# Patient Record
Sex: FEMALE | Race: WHITE | NOT HISPANIC OR LATINO | Employment: FULL TIME | URBAN - METROPOLITAN AREA
[De-identification: names, ages, dates, MRNs, and addresses within clinical notes are randomized per-mention and may not be internally consistent; named-entity substitution may affect disease eponyms.]

---

## 2018-02-04 ENCOUNTER — OFFICE VISIT (OUTPATIENT)
Dept: URGENT CARE | Facility: CLINIC | Age: 28
End: 2018-02-04
Payer: COMMERCIAL

## 2018-02-04 ENCOUNTER — HOSPITAL ENCOUNTER (OUTPATIENT)
Facility: MEDICAL CENTER | Age: 28
End: 2018-02-04
Attending: PHYSICIAN ASSISTANT
Payer: COMMERCIAL

## 2018-02-04 VITALS
WEIGHT: 112.43 LBS | DIASTOLIC BLOOD PRESSURE: 58 MMHG | HEIGHT: 66 IN | SYSTOLIC BLOOD PRESSURE: 98 MMHG | HEART RATE: 84 BPM | OXYGEN SATURATION: 98 % | BODY MASS INDEX: 18.07 KG/M2 | TEMPERATURE: 100 F | RESPIRATION RATE: 14 BRPM

## 2018-02-04 DIAGNOSIS — N30.01 ACUTE CYSTITIS WITH HEMATURIA: ICD-10-CM

## 2018-02-04 DIAGNOSIS — R10.30 LOWER ABDOMINAL PAIN: ICD-10-CM

## 2018-02-04 LAB
APPEARANCE UR: NORMAL
BILIRUB UR STRIP-MCNC: NORMAL MG/DL
COLOR UR AUTO: NORMAL
GLUCOSE UR STRIP.AUTO-MCNC: NORMAL MG/DL
INT CON NEG: NEGATIVE
INT CON POS: POSITIVE
KETONES UR STRIP.AUTO-MCNC: NORMAL MG/DL
LEUKOCYTE ESTERASE UR QL STRIP.AUTO: NORMAL
NITRITE UR QL STRIP.AUTO: NORMAL
PH UR STRIP.AUTO: 6.5 [PH] (ref 5–8)
POC URINE PREGNANCY TEST: NORMAL
PROT UR QL STRIP: NORMAL MG/DL
RBC UR QL AUTO: NORMAL
SP GR UR STRIP.AUTO: 1.02
UROBILINOGEN UR STRIP-MCNC: 0.2 MG/DL

## 2018-02-04 PROCEDURE — 81002 URINALYSIS NONAUTO W/O SCOPE: CPT | Performed by: PHYSICIAN ASSISTANT

## 2018-02-04 PROCEDURE — 81025 URINE PREGNANCY TEST: CPT | Performed by: PHYSICIAN ASSISTANT

## 2018-02-04 PROCEDURE — 99204 OFFICE O/P NEW MOD 45 MIN: CPT | Performed by: PHYSICIAN ASSISTANT

## 2018-02-04 PROCEDURE — 87086 URINE CULTURE/COLONY COUNT: CPT

## 2018-02-04 RX ORDER — CIPROFLOXACIN 500 MG/1
500 TABLET, FILM COATED ORAL EVERY 12 HOURS
Qty: 14 TAB | Refills: 0 | Status: SHIPPED | OUTPATIENT
Start: 2018-02-04 | End: 2018-02-11

## 2018-02-04 RX ORDER — NAPROXEN 500 MG/1
500 TABLET ORAL 2 TIMES DAILY WITH MEALS
Qty: 60 TAB | Refills: 0 | Status: SHIPPED | OUTPATIENT
Start: 2018-02-04 | End: 2018-03-06

## 2018-02-04 ASSESSMENT — ENCOUNTER SYMPTOMS
FEVER: 0
BACK PAIN: 0
CHILLS: 0
NAUSEA: 1
FLANK PAIN: 1
COUGH: 0
PALPITATIONS: 0
ABDOMINAL PAIN: 1
SHORTNESS OF BREATH: 0

## 2018-02-04 NOTE — PROGRESS NOTES
"Subjective:      Chase Noland is a 27 y.o. female who presents with Abdominal Pain (lower abdominal pain x 1 day . needs school note .)            Abdominal Pain   This is a new problem. The current episode started today. The onset quality is sudden. The problem occurs constantly. The problem has been gradually improving. The pain is located in the suprapubic region. The pain is moderate. The quality of the pain is sharp. The abdominal pain radiates to the back. Associated symptoms include nausea. Pertinent negatives include no dysuria, fever, frequency or hematuria. The pain is relieved by nothing. There is no history of abdominal surgery.       Review of Systems   Constitutional: Negative for chills and fever.   Respiratory: Negative for cough and shortness of breath.    Cardiovascular: Negative for chest pain and palpitations.   Gastrointestinal: Positive for abdominal pain and nausea.   Genitourinary: Positive for flank pain. Negative for dysuria, frequency, hematuria and urgency.   Musculoskeletal: Negative for back pain.   All other systems reviewed and are negative.    PMH:  has no past medical history on file.  MEDS:   Current Outpatient Prescriptions:   •  naproxen (NAPROSYN) 500 MG Tab, Take 1 Tab by mouth 2 times a day, with meals for 30 days., Disp: 60 Tab, Rfl: 0  •  ciprofloxacin (CIPRO) 500 MG Tab, Take 1 Tab by mouth every 12 hours for 7 days., Disp: 14 Tab, Rfl: 0  ALLERGIES: No Known Allergies  SURGHX: History reviewed. No pertinent surgical history.  SOCHX:  reports that she has never smoked. She has never used smokeless tobacco.  FH: Family history was reviewed, no pertinent findings to report  Medications, Allergies, and current problem list reviewed today in Epic       Objective:     BP (!) 98/58   Pulse 84   Temp 37.8 °C (100 °F)   Resp 14   Ht 1.68 m (5' 6.14\")   Wt 51 kg (112 lb 7 oz)   LMP 01/17/2018   SpO2 98%   Breastfeeding? No   BMI 18.07 kg/m²      Physical Exam "   Constitutional: She is oriented to person, place, and time. She appears well-developed and well-nourished. She is active.  Non-toxic appearance. She does not have a sickly appearance. She does not appear ill. No distress.   HENT:   Head: Normocephalic and atraumatic.   Right Ear: External ear normal.   Left Ear: External ear normal.   Nose: Nose normal.   Mouth/Throat: Oropharynx is clear and moist.   Eyes: Conjunctivae, EOM and lids are normal.   Neck: Normal range of motion and full passive range of motion without pain. Neck supple.   Cardiovascular: Normal rate, regular rhythm, S1 normal, S2 normal and normal heart sounds.  Exam reveals no gallop and no friction rub.    No murmur heard.  Pulmonary/Chest: Effort normal and breath sounds normal. No respiratory distress. She has no decreased breath sounds. She has no wheezes. She has no rales. She exhibits no tenderness.   Abdominal: Soft. Normal appearance and bowel sounds are normal. She exhibits no shifting dullness, no distension, no pulsatile liver, no fluid wave, no abdominal bruit, no ascites, no pulsatile midline mass and no mass. There is tenderness in the suprapubic area. There is no rigidity, no rebound, no guarding, no CVA tenderness, no tenderness at McBurney's point and negative Capone's sign.       Musculoskeletal: Normal range of motion. She exhibits no edema, tenderness or deformity.   CVA tenderness present.   Neurological: She is alert and oriented to person, place, and time.   Skin: Skin is warm, dry and intact.   Psychiatric: She has a normal mood and affect. Her speech is normal and behavior is normal. Judgment and thought content normal.   Vitals reviewed.         POC Color kaushal  Negative Final   POC Appearance cloudy  Negative Final   POC Leukocyte Esterase lg  Negative Final   POC Nitrites neg  Negative Final   POC Urobiligen 0.2  Negative (0.2) mg/dL Final   POC Protein lg  Negative mg/dL Final   POC Urine PH 6.5  5.0 - 8.0 Final   POC  Blood lg  Negative Final   POC Specific Gravity 1.025  <1.005 - >1.030 Final   POC Ketones mod  Negative mg/dL Final   POC Biliruben neg  Negative mg/dL Final   POC Glucose neg  Negative mg/dL Final          Assessment/Plan:   Patient is a 27-year-old female who presents with lower abdominal pain for one hour. Patient states that she is having intercourse with her partner and afterwards experienced sharp pains in her lower abdomen which radiated to her back. The pain has subsided significantly since then. Vitals show low-grade fever. Abdominal pain in the super pubic region. CVS tenderness is present. No rebound tenderness noted. UA shows large amount of leukocytes and blood. Possible UTI. Also given her history possible ovarian cysts. We will trial antibiotics while culture is pending. Strict emergency precautions were given if the pain continues or worsens to go to the ED.  1. Lower abdominal pain    - POCT Pregnancy  - POCT Urinalysis  - naproxen (NAPROSYN) 500 MG Tab; Take 1 Tab by mouth 2 times a day, with meals for 30 days.  Dispense: 60 Tab; Refill: 0    2. Acute cystitis with hematuria    - Urine Culture; Future  - ciprofloxacin (CIPRO) 500 MG Tab; Take 1 Tab by mouth every 12 hours for 7 days.  Dispense: 14 Tab; Refill: 0    Differential diagnosis, natural history, supportive care discussed. Follow-up with primary care provider within 7-10 days, emergency room precautions discussed.  Patient and/or family appears understanding of information.

## 2018-02-04 NOTE — PATIENT INSTRUCTIONS
"Ovarian Cyst  An ovarian cyst is a fluid-filled sac that forms on an ovary. The ovaries are small organs that produce eggs in women. Various types of cysts can form on the ovaries. Most are not cancerous. Many do not cause problems, and they often go away on their own. Some may cause symptoms and require treatment. Common types of ovarian cysts include:  · Functional cysts--These cysts may occur every month during the menstrual cycle. This is normal. The cysts usually go away with the next menstrual cycle if the woman does not get pregnant. Usually, there are no symptoms with a functional cyst.  · Endometrioma cysts--These cysts form from the tissue that lines the uterus. They are also called \"chocolate cysts\" because they become filled with blood that turns brown. This type of cyst can cause pain in the lower abdomen during intercourse and with your menstrual period.  · Cystadenoma cysts--This type develops from the cells on the outside of the ovary. These cysts can get very big and cause lower abdomen pain and pain with intercourse. This type of cyst can twist on itself, cut off its blood supply, and cause severe pain. It can also easily rupture and cause a lot of pain.  · Dermoid cysts--This type of cyst is sometimes found in both ovaries. These cysts may contain different kinds of body tissue, such as skin, teeth, hair, or cartilage. They usually do not cause symptoms unless they get very big.  · Theca lutein cysts--These cysts occur when too much of a certain hormone (human chorionic gonadotropin) is produced and overstimulates the ovaries to produce an egg. This is most common after procedures used to assist with the conception of a baby (in vitro fertilization).  CAUSES   · Fertility drugs can cause a condition in which multiple large cysts are formed on the ovaries. This is called ovarian hyperstimulation syndrome.  · A condition called polycystic ovary syndrome can cause hormonal imbalances that can lead to " nonfunctional ovarian cysts.  SIGNS AND SYMPTOMS   Many ovarian cysts do not cause symptoms. If symptoms are present, they may include:  · Pelvic pain or pressure.  · Pain in the lower abdomen.  · Pain during/after sexual intercourse.  · Increasing girth (swelling) of the abdomen.  · Abnormal menstrual periods.  · Increasing pain with menstrual periods.  · Stopping having menstrual periods without being pregnant.  DIAGNOSIS   These cysts are commonly found during a routine or annual pelvic exam. Tests may be ordered to find out more about the cyst. These tests may include:  · Ultrasound.  · X-ray of the pelvis.  · CT scan.  · MRI.  · Blood tests.  TREATMENT   Many ovarian cysts go away on their own without treatment. Your health care provider may want to check your cyst regularly for 2-3 months to see if it changes. For women in menopause, it is particularly important to monitor a cyst closely because of the higher rate of ovarian cancer in menopausal women. When treatment is needed, it may include any of the following:  · A procedure to drain the cyst (aspiration). This may be done using a long needle and ultrasound. It can also be done through a laparoscopic procedure. This involves using a thin, lighted tube with a tiny camera on the end (laparoscope) inserted through a small incision.  · Surgery to remove the whole cyst. This may be done using laparoscopic surgery or an open surgery involving a larger incision in the lower abdomen.  · Hormone treatment or birth control pills. These methods are sometimes used to help dissolve a cyst.  HOME CARE INSTRUCTIONS   · Only take over-the-counter or prescription medicines as directed by your health care provider.  · Follow up with your health care provider as directed.  · Get regular pelvic exams and Pap tests.  SEEK MEDICAL CARE IF:   · Your periods are late, irregular, or painful, or they stop.  · Your pelvic pain or abdominal pain does not go away.  · Your abdomen  becomes larger or swollen.  · You have pressure on your bladder or trouble emptying your bladder completely.  · You have pain during sexual intercourse.  · You have feelings of fullness, pressure, or discomfort in your stomach.  · You lose weight for no apparent reason.  · You feel generally ill.  · You become constipated.  · You lose your appetite.  · You develop acne.  · You have an increase in body and facial hair.  · You are gaining weight, without changing your exercise and eating habits.  · You think you are pregnant.  SEEK IMMEDIATE MEDICAL CARE IF:   · You have increasing abdominal pain.  · You feel sick to your stomach (nauseous), and you throw up (vomit).  · You develop a fever that comes on suddenly.  · You have abdominal pain during a bowel movement.  · Your menstrual periods become heavier than usual.  MAKE SURE YOU:  · Understand these instructions.  · Will watch your condition.  · Will get help right away if you are not doing well or get worse.     This information is not intended to replace advice given to you by your health care provider. Make sure you discuss any questions you have with your health care provider.     Document Released: 12/18/2006 Document Revised: 12/23/2014 Document Reviewed: 08/25/2014  [employer] Interactive Patient Education ©2016 [employer]

## 2018-02-04 NOTE — LETTER
February 4, 2018         Patient: Chase Noland   YOB: 1990   Date of Visit: 2/4/2018           To Whom it May Concern:    Chase Noland was seen in my clinic on 2/4/2018. Please excuse her from school on 2/5/2018.    If you have any questions or concerns, please don't hesitate to call.        Sincerely,           Fran Wen P.A.-C.  Electronically Signed

## 2018-02-05 DIAGNOSIS — N30.01 ACUTE CYSTITIS WITH HEMATURIA: ICD-10-CM

## 2018-02-07 LAB
BACTERIA UR CULT: NORMAL
SIGNIFICANT IND 70042: NORMAL
SITE SITE: NORMAL
SOURCE SOURCE: NORMAL

## 2019-09-16 ENCOUNTER — TELEPHONE (OUTPATIENT)
Dept: SCHEDULING | Facility: IMAGING CENTER | Age: 29
End: 2019-09-16

## 2019-09-17 ENCOUNTER — OFFICE VISIT (OUTPATIENT)
Dept: MEDICAL GROUP | Facility: PHYSICIAN GROUP | Age: 29
End: 2019-09-17
Payer: COMMERCIAL

## 2019-09-17 VITALS
DIASTOLIC BLOOD PRESSURE: 62 MMHG | WEIGHT: 106 LBS | HEIGHT: 66 IN | TEMPERATURE: 100.1 F | OXYGEN SATURATION: 99 % | HEART RATE: 86 BPM | BODY MASS INDEX: 17.04 KG/M2 | SYSTOLIC BLOOD PRESSURE: 98 MMHG

## 2019-09-17 DIAGNOSIS — N30.10 INTERSTITIAL CYSTITIS: ICD-10-CM

## 2019-09-17 DIAGNOSIS — N63.20 LEFT BREAST MASS: ICD-10-CM

## 2019-09-17 PROCEDURE — 99214 OFFICE O/P EST MOD 30 MIN: CPT | Performed by: PHYSICIAN ASSISTANT

## 2019-09-17 RX ORDER — PENTOSAN POLYSULFATE SODIUM 100 MG/1
CAPSULE, GELATIN COATED ORAL
COMMUNITY
Start: 2019-07-23 | End: 2021-09-29

## 2019-09-17 SDOH — HEALTH STABILITY: MENTAL HEALTH: HOW OFTEN DO YOU HAVE A DRINK CONTAINING ALCOHOL?: NEVER

## 2019-09-17 ASSESSMENT — PATIENT HEALTH QUESTIONNAIRE - PHQ9: CLINICAL INTERPRETATION OF PHQ2 SCORE: 0

## 2019-09-17 NOTE — PROGRESS NOTES
"Subjective:   Chase Noland is a 29 y.o. female here today for left breast lump. Is a new patient to me and is also establishing care today.    Previous PCP: in [place]    HPI:    Patient presents to the office today to establish care with me.  Her primary concern is regarding a mass that she recently noticed in her left breast.  She first noticed this past Saturday but thinks it may have been present before then.  She denies any new tenderness since noticing the lump but states that her breasts are generally tender most of the time.  She has not noticed any nipple discharge.  She does recall having a couple of breast ultrasounds done about 3 years ago in her home country of [facility].  She states that the second one was repeated 3 months after the first and she was told that everything was fine.  She denies any family history of breast cancer.    Only other current medical problem is interstitial cystitis.  She follows with [facility] for this and is on Elmiron.  Endorses previous cystoscopy back in [facility].      Current medicines (including changes today)  Current Outpatient Medications   Medication Sig Dispense Refill   • ELMIRON 100 MG Cap        No current facility-administered medications for this visit.      She  has a past medical history of Interstitial cystitis.    ROS   As per HPI.       Objective:     BP (!) 98/62 (BP Location: Left arm, Patient Position: Sitting, BP Cuff Size: Adult)   Pulse 86   Temp 37.8 °C (100.1 °F)   Ht 1.68 m (5' 6.14\")   Wt 48.1 kg (106 lb)   SpO2 99%  Body mass index is 17.04 kg/m².     Physical Exam:  Constitutional: Alert, appears anxious, no distress.  Skin: No rashes in visible areas.  Full-time  Eye: Pupils are equal and round, conjunctiva clear, lids normal.  ENMT: Lips without lesions, moist mucus membranes.  Respiratory: Unlabored respiratory effort, lungs clear to auscultation, no wheezes, no rhonchi.  Cardiovascular: Normal S1, S2, no murmur.  Breast: " Refused      Assessment and Plan:   The following treatment plan was discussed    1. Left breast mass  New problem, uncontrolled.  I have ordered left breast ultrasound to further evaluate.  She did refuse a clinical breast exam today in the office.  We did discuss that generally it is best to combine clinical findings with the imaging findings.  Further recommendations pending imaging results.  - MA-DIAGNOSTIC MAMMO UNI W/TOMOSYNTHESIS W/CAD; Future    2. Interstitial cystitis  New problem to me, stable.  Should continue to follow with her urologist.      Followup: Return for f/u pending imaging results.    Rebeca Byrnes P.A.-C.

## 2019-09-17 NOTE — LETTER
[facility]  Rebeca Byrnes P.A.-C.  [address]  [place] [address]  Fax: [phone]   Authorization for Release/Disclosure of   Protected Health Information   Name: CHASE MACARIO : 1990 SSN: [id number]   Address: [address] [place] [address]   [place] [address] Phone:    [phone] (home)    I authorize the entity listed below to release/disclose the PHI below to:   [facility]/Rebeca Byrnes P.A.-C. and Rebeca Byrnes P.A.-C.   Provider or Entity Name:  [facility]    Address   City, State, Zip   Phone:      Fax:     Reason for request: continuity of care   Information to be released:    [  ] LAST COLONOSCOPY,  including any PATH REPORT and follow-up  [  ] LAST FIT/COLOGUARD RESULT [  ] LAST DEXA  [  ] LAST MAMMOGRAM  [  ] LAST PAP  [  ] LAST LABS [  ] RETINA EXAM REPORT  [  ] IMMUNIZATION RECORDS  [ xx ] Release all info      [  ] Check here and initial the line next to each item to release ALL health information INCLUDING  _____ Care and treatment for drug and / or alcohol abuse  _____ HIV testing, infection status, or AIDS  _____ Genetic Testing    DATES OF SERVICE OR TIME PERIOD TO BE DISCLOSED: _____________  I understand and acknowledge that:  * This Authorization may be revoked at any time by you in writing, except if your health information has already been used or disclosed.  * Your health information that will be used or disclosed as a result of you signing this authorization could be re-disclosed by the recipient. If this occurs, your re-disclosed health information may no longer be protected by State or Federal laws.  * You may refuse to sign this Authorization. Your refusal will not affect your ability to obtain treatment.  * This Authorization becomes effective upon signing and will  on (date) __________.      If no date is indicated, this Authorization will  one (1) year from the signature date.    Name: Chase Macario    Signature:   Date:     9/17/2019       PLEASE FAX REQUESTED RECORDS BACK TO: [phone]

## 2019-09-20 ENCOUNTER — HOSPITAL ENCOUNTER (OUTPATIENT)
Dept: RADIOLOGY | Facility: MEDICAL CENTER | Age: 29
End: 2019-09-20
Attending: PHYSICIAN ASSISTANT
Payer: COMMERCIAL

## 2019-09-20 DIAGNOSIS — N63.20 LEFT BREAST MASS: ICD-10-CM

## 2019-09-20 PROCEDURE — 76642 ULTRASOUND BREAST LIMITED: CPT | Mod: LT

## 2021-04-02 ENCOUNTER — APPOINTMENT (OUTPATIENT)
Dept: RADIOLOGY | Facility: IMAGING CENTER | Age: 31
End: 2021-04-02
Attending: NURSE PRACTITIONER
Payer: COMMERCIAL

## 2021-04-02 ENCOUNTER — OFFICE VISIT (OUTPATIENT)
Dept: URGENT CARE | Facility: PHYSICIAN GROUP | Age: 31
End: 2021-04-02
Payer: COMMERCIAL

## 2021-04-02 VITALS
OXYGEN SATURATION: 95 % | SYSTOLIC BLOOD PRESSURE: 100 MMHG | BODY MASS INDEX: 18.33 KG/M2 | HEIGHT: 65 IN | WEIGHT: 110 LBS | DIASTOLIC BLOOD PRESSURE: 60 MMHG | HEART RATE: 95 BPM | TEMPERATURE: 99.1 F | RESPIRATION RATE: 12 BRPM

## 2021-04-02 DIAGNOSIS — R29.898 JAW CLICKING: ICD-10-CM

## 2021-04-02 DIAGNOSIS — S09.93XA FACIAL INJURY, INITIAL ENCOUNTER: ICD-10-CM

## 2021-04-02 DIAGNOSIS — S00.83XA CONTUSION OF JAW, INITIAL ENCOUNTER: Primary | ICD-10-CM

## 2021-04-02 PROCEDURE — 99214 OFFICE O/P EST MOD 30 MIN: CPT | Performed by: NURSE PRACTITIONER

## 2021-04-02 ASSESSMENT — ENCOUNTER SYMPTOMS
CHILLS: 0
FEVER: 0
SORE THROAT: 0
COUGH: 0

## 2021-04-02 NOTE — PROGRESS NOTES
"Chase Noland is a 31 y.o. female who presents for Facial Injury (x2days, left side jaw , got hit with a tennis ball , a little painful when moving jaw up or down , makes a popping sound)      HPI She was playing tennis 2 days ago . The ball was hit really hard from the other side of the court. The ball hit onto her left jaw. From that time she feels 'weird\" when she opens her jaw. No brusing or swelling. She has only been able to eat small bites. Concerned about possible fracture. \" the ball was hit really hard\". Pain is moderate to severe. Treatment tried: \" I dont take medication\". Ice packs.   No dentist.   Accompanied by her  today.     Review of Systems   Constitutional: Negative for chills, fever and malaise/fatigue.   HENT: Negative for sore throat.    Respiratory: Negative for cough.    Cardiovascular: Negative for chest pain.       No Known Allergies  Past Medical History:   Diagnosis Date   • Interstitial cystitis      History reviewed. No pertinent surgical history.  Family History   Problem Relation Age of Onset   • No Known Problems Mother    • No Known Problems Father    • No Known Problems Sister      Social History     Tobacco Use   • Smoking status: Never Smoker   • Smokeless tobacco: Never Used   Substance Use Topics   • Alcohol use: Never     Patient Active Problem List   Diagnosis   • Interstitial cystitis   • Left breast mass     Current Outpatient Medications on File Prior to Visit   Medication Sig Dispense Refill   • ELMIRON 100 MG Cap        No current facility-administered medications on file prior to visit.          Objective:     /60 (BP Location: Left arm, Patient Position: Sitting, BP Cuff Size: Adult)   Pulse 95   Temp 37.3 °C (99.1 °F) (Temporal)   Resp 12   Ht 1.651 m (5' 5\")   Wt 49.9 kg (110 lb)   SpO2 95%   BMI 18.30 kg/m²     Physical Exam  Vitals and nursing note reviewed.   Constitutional:       General: She is not in acute distress.     Appearance: " Normal appearance. She is well-developed. She is not toxic-appearing.   HENT:      Head: Normocephalic.      Jaw: Tenderness and pain on movement present. No trismus, swelling or malocclusion.        Mouth/Throat:      Lips: Pink.      Mouth: Mucous membranes are moist.      Dentition: Abnormal dentition. Dental caries present. No dental abscesses.      Pharynx: Oropharynx is clear. Uvula midline.   Cardiovascular:      Rate and Rhythm: Normal rate and regular rhythm.   Pulmonary:      Effort: Pulmonary effort is normal.   Musculoskeletal:      Cervical back: Normal range of motion and neck supple.   Lymphadenopathy:      Cervical: No cervical adenopathy.   Skin:     General: Skin is warm and dry.      Capillary Refill: Capillary refill takes less than 2 seconds.   Neurological:      Mental Status: She is alert and oriented to person, place, and time.   Psychiatric:         Behavior: Behavior normal. Behavior is cooperative.         Thought Content: Thought content normal.             4/2/2021 3:14 PM     HISTORY/REASON FOR EXAM:  Left-sided jaw pain.        TECHNIQUE/EXAM DESCRIPTION AND NUMBER OF VIEWS:  4 view(s) of the mandible.     COMPARISON:  None.     FINDINGS:  Bone mineralization is normal. There is no evidence of fracture or arthropathy. No destructive bone lesions.     IMPRESSION:     No evidence of fracture or arthropathy.             Last Resulted: 04/02/21  3:36 PM        Xray: Reviewed and interpreted independently by me. I agree with the radiologist's findings.       Assessment /Associated Orders:      1. Contusion of jaw, initial encounter     2. Jaw clicking  DX-MANDIBLE-LIMITED - UNILATERAL 3- LEFT   3. Facial injury, initial encounter  DX-MANDIBLE-LIMITED - UNILATERAL 3- LEFT             Medical Decision Making:    Pt is clinically stable at today's acute urgent care visit.  No acute distress noted. Appropriate for outpatient management at this time.     Results of all diagnostic tests discussed  with patient.   Her pain is most likely inflammatory in nature and will spont. resolve with time. Xray did not show acute fracture, malalignment or deformity. Exam today is non-specific and only subjective tenderness is identified.  Otc NSAIDS. Dosage and directions per   OTC acetaminophen for breakthrough pain. Dosage and directions per . Do not exceed 3000 mg in 24 hours.   Ice packs prn pain   FV denist for persistent pain/ click  Avoid chewy foods, gum.       Advised the patient to follow-up with the primary care provider for recheck, reevaluation, and consideration of further management if necessary.   Discussed management options (risks,benefits, and alternatives to treatment). Pt expresses understanding and the treatment plan was agreed upon. Questions were encouraged and answered to pt's satisfaction.       Return to urgent care prn if new or worsening sx or if there is no improvement in condition prn .     I personally reviewed prior external notes and test results pertinent to today's visit.  I have independently reviewed and interpreted all diagnostics ordered during this urgent care acute visit.   Time spent evaluating this patient was at least 30 minutes and includes preparing for visit, counseling/education, exam and evaluation, obtaining history, independent interpretation, ordering lab/test/procedures and medication management.

## 2021-09-29 ENCOUNTER — HOSPITAL ENCOUNTER (OUTPATIENT)
Dept: RADIOLOGY | Facility: MEDICAL CENTER | Age: 31
End: 2021-09-29
Attending: NURSE PRACTITIONER
Payer: COMMERCIAL

## 2021-09-29 ENCOUNTER — OFFICE VISIT (OUTPATIENT)
Dept: URGENT CARE | Facility: PHYSICIAN GROUP | Age: 31
End: 2021-09-29
Payer: COMMERCIAL

## 2021-09-29 ENCOUNTER — APPOINTMENT (OUTPATIENT)
Dept: URGENT CARE | Facility: PHYSICIAN GROUP | Age: 31
End: 2021-09-29
Payer: COMMERCIAL

## 2021-09-29 ENCOUNTER — APPOINTMENT (OUTPATIENT)
Dept: MEDICAL GROUP | Facility: PHYSICIAN GROUP | Age: 31
End: 2021-09-29
Payer: COMMERCIAL

## 2021-09-29 VITALS
BODY MASS INDEX: 18.33 KG/M2 | HEART RATE: 95 BPM | HEIGHT: 65 IN | WEIGHT: 110 LBS | DIASTOLIC BLOOD PRESSURE: 76 MMHG | OXYGEN SATURATION: 98 % | TEMPERATURE: 98.8 F | SYSTOLIC BLOOD PRESSURE: 102 MMHG | RESPIRATION RATE: 18 BRPM
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
## 2021-09-29 DIAGNOSIS — M54.50 ACUTE BILATERAL LOW BACK PAIN WITHOUT SCIATICA: ICD-10-CM

## 2021-09-29 DIAGNOSIS — M62.830 LUMBAR PARASPINAL MUSCLE SPASM: ICD-10-CM

## 2021-09-29 PROCEDURE — 72100 X-RAY EXAM L-S SPINE 2/3 VWS: CPT

## 2021-09-29 PROCEDURE — 99214 OFFICE O/P EST MOD 30 MIN: CPT | Performed by: NURSE PRACTITIONER

## 2021-09-29 RX ORDER — CYCLOBENZAPRINE HCL 5 MG
5-10 TABLET ORAL 3 TIMES DAILY PRN
Qty: 30 TABLET | Refills: 0 | Status: SHIPPED | OUTPATIENT
Start: 2021-09-29 | End: 2022-01-24

## 2021-09-29 RX ORDER — NAPROXEN 500 MG/1
500 TABLET ORAL 2 TIMES DAILY WITH MEALS
Qty: 60 TABLET | Refills: 0 | Status: SHIPPED | OUTPATIENT
Start: 2021-09-29 | End: 2022-01-24

## 2021-09-29 RX ORDER — KETOROLAC TROMETHAMINE 30 MG/ML
30 INJECTION, SOLUTION INTRAMUSCULAR; INTRAVENOUS ONCE
Status: COMPLETED | OUTPATIENT
Start: 2021-09-29 | End: 2021-09-29

## 2021-09-29 RX ADMIN — KETOROLAC TROMETHAMINE 30 MG: 30 INJECTION, SOLUTION INTRAMUSCULAR; INTRAVENOUS at 15:19

## 2021-09-29 ASSESSMENT — PAIN SCALES - GENERAL: PAINLEVEL: 8=MODERATE-SEVERE PAIN

## 2021-09-29 ASSESSMENT — ENCOUNTER SYMPTOMS
GASTROINTESTINAL NEGATIVE: 1
CARDIOVASCULAR NEGATIVE: 1
PSYCHIATRIC NEGATIVE: 1
BACK PAIN: 1
EYES NEGATIVE: 1
RESPIRATORY NEGATIVE: 1
MYALGIAS: 1
CONSTITUTIONAL NEGATIVE: 1
NEUROLOGICAL NEGATIVE: 1
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
## 2022-01-24 ENCOUNTER — OFFICE VISIT (OUTPATIENT)
Dept: URGENT CARE | Facility: PHYSICIAN GROUP | Age: 32
End: 2022-01-24
Payer: COMMERCIAL

## 2022-01-24 VITALS
BODY MASS INDEX: 19.16 KG/M2 | TEMPERATURE: 98.2 F | HEART RATE: 84 BPM | RESPIRATION RATE: 18 BRPM | SYSTOLIC BLOOD PRESSURE: 104 MMHG | HEIGHT: 65 IN | OXYGEN SATURATION: 99 % | DIASTOLIC BLOOD PRESSURE: 70 MMHG | WEIGHT: 115 LBS

## 2022-01-24 DIAGNOSIS — R53.83 FATIGUE, UNSPECIFIED TYPE: ICD-10-CM

## 2022-01-24 DIAGNOSIS — N94.6 DYSMENORRHEA: ICD-10-CM

## 2022-01-24 DIAGNOSIS — R10.30 LOWER ABDOMINAL PAIN: ICD-10-CM

## 2022-01-24 LAB
APPEARANCE UR: CLEAR
BILIRUB UR STRIP-MCNC: NORMAL MG/DL
COLOR UR AUTO: NORMAL
GLUCOSE UR STRIP.AUTO-MCNC: NORMAL MG/DL
INT CON NEG: NEGATIVE
INT CON POS: NORMAL
KETONES UR STRIP.AUTO-MCNC: NORMAL MG/DL
LEUKOCYTE ESTERASE UR QL STRIP.AUTO: NORMAL
NITRITE UR QL STRIP.AUTO: NORMAL
PH UR STRIP.AUTO: 5.5 [PH] (ref 5–8)
POC URINE PREGNANCY TEST: NORMAL
PROT UR QL STRIP: NORMAL MG/DL
RBC UR QL AUTO: NORMAL
SP GR UR STRIP.AUTO: 1.02
UROBILINOGEN UR STRIP-MCNC: NORMAL MG/DL

## 2022-01-24 PROCEDURE — 81025 URINE PREGNANCY TEST: CPT | Performed by: PHYSICIAN ASSISTANT

## 2022-01-24 PROCEDURE — 99214 OFFICE O/P EST MOD 30 MIN: CPT | Performed by: PHYSICIAN ASSISTANT

## 2022-01-24 PROCEDURE — 81002 URINALYSIS NONAUTO W/O SCOPE: CPT | Performed by: PHYSICIAN ASSISTANT

## 2022-01-24 ASSESSMENT — PAIN SCALES - GENERAL: PAINLEVEL: 3=SLIGHT PAIN

## 2022-01-24 ASSESSMENT — ENCOUNTER SYMPTOMS
FEVER: 0
CONSTIPATION: 0

## 2022-01-24 NOTE — PROGRESS NOTES
"  Subjective:   Chase Noland is a 31 y.o. female who presents today with   Chief Complaint   Patient presents with   • Menstrual Problem     x3 menses since dec, abd pains     Menstrual Problem  The patient's primary symptoms include vaginal bleeding. The patient's pertinent negatives include no genital itching, genital lesions, missed menses or vaginal discharge. This is a new problem. The current episode started more than 1 month ago. The problem has been waxing and waning. The pain is moderate. Pertinent negatives include no constipation, fever, painful intercourse or urgency.     Patient states she has had 3 menstrual cycle since December with the longest being 10 days that she is currently experiencing.  PMH:  has a past medical history of Interstitial cystitis.  MEDS: No current outpatient medications on file.  ALLERGIES: No Known Allergies  SURGHX: No past surgical history on file.  SOCHX:  reports that she has never smoked. She has never used smokeless tobacco. She reports that she does not drink alcohol and does not use drugs.  FH: Reviewed with patient, not pertinent to this visit.     Review of Systems   Constitutional: Positive for malaise/fatigue. Negative for fever.   Gastrointestinal: Negative for constipation.   Genitourinary: Positive for menstrual problem. Negative for missed menses, urgency and vaginal discharge.      Objective:   /70   Pulse 84   Temp 36.8 °C (98.2 °F)   Resp 18   Ht 1.651 m (5' 5\")   Wt 52.2 kg (115 lb)   SpO2 99%   BMI 19.14 kg/m²   Physical Exam  Vitals and nursing note reviewed.   Constitutional:       General: She is not in acute distress.     Appearance: Normal appearance. She is well-developed. She is not ill-appearing or toxic-appearing.   HENT:      Head: Normocephalic and atraumatic.      Right Ear: Hearing normal.      Left Ear: Hearing normal.   Eyes:      Conjunctiva/sclera: Conjunctivae normal.   Cardiovascular:      Rate and Rhythm: Normal rate " and regular rhythm.      Heart sounds: Normal heart sounds.   Pulmonary:      Effort: Pulmonary effort is normal.      Breath sounds: Normal breath sounds. No stridor. No wheezing, rhonchi or rales.   Abdominal:      Tenderness: There is abdominal tenderness in the suprapubic area. There is no right CVA tenderness or left CVA tenderness.   Genitourinary:     Comments: Patient deferred  exam  Musculoskeletal:      Comments: Normal movement in all 4 extremities   Skin:     General: Skin is warm and dry.   Neurological:      Mental Status: She is alert.      Coordination: Coordination normal.   Psychiatric:         Mood and Affect: Mood normal.       UA negative for infection  Pregnancy Negative    Ultrasound Pending  Assessment/Plan:   Assessment    1. Lower abdominal pain  - POCT Urinalysis  - POCT Pregnancy  - US-PELVIC COMPLETE (TRANSABDOMINAL/TRANSVAGINAL) (COMBO); Future  - Referral to OB/Gyn    2. Dysmenorrhea  - CBC WITH DIFFERENTIAL; Future  - Comp Metabolic Panel; Future  - US-PELVIC COMPLETE (TRANSABDOMINAL/TRANSVAGINAL) (COMBO); Future  - Referral to OB/Gyn    3. Fatigue, unspecified type  - CBC WITH DIFFERENTIAL; Future  - Comp Metabolic Panel; Future  Offered STD and vaginal pathogen testing today but patient declines.  We will follow up with ultrasound for potential ovarian cyst versus fibroids or other cervical/ovarian etiology.  Recommend follow-up with OB/GYN as well.  We will also follow-up with lab results to see if there is any concern for anemia given patient's fatigue.  Differential diagnosis, natural history, supportive care, and indications for immediate follow-up discussed.   Patient given instructions and understanding of medications and treatment.    If not improving in 3-5 days, F/U with PCP or return to UC if symptoms worsen.    Patient agreeable to plan.    Please note that this dictation was created using voice recognition software. I have made every reasonable attempt to correct  obvious errors, but I expect that there are errors of grammar and possibly content that I did not discover before finalizing the note.    Pancho Tolentino PA-C

## 2022-01-25 ENCOUNTER — APPOINTMENT (OUTPATIENT)
Dept: RADIOLOGY | Facility: MEDICAL CENTER | Age: 32
End: 2022-01-25
Attending: PHYSICIAN ASSISTANT
Payer: COMMERCIAL